# Patient Record
Sex: FEMALE | Race: WHITE | URBAN - METROPOLITAN AREA
[De-identification: names, ages, dates, MRNs, and addresses within clinical notes are randomized per-mention and may not be internally consistent; named-entity substitution may affect disease eponyms.]

---

## 2022-11-17 ENCOUNTER — OFFICE VISIT (OUTPATIENT)
Dept: INTERNAL MEDICINE CLINIC | Age: 32
End: 2022-11-17
Payer: COMMERCIAL

## 2022-11-17 VITALS
OXYGEN SATURATION: 98 % | SYSTOLIC BLOOD PRESSURE: 122 MMHG | WEIGHT: 157.6 LBS | DIASTOLIC BLOOD PRESSURE: 76 MMHG | BODY MASS INDEX: 26.91 KG/M2 | HEIGHT: 64 IN | HEART RATE: 87 BPM

## 2022-11-17 DIAGNOSIS — N89.8 VAGINAL DISCHARGE: ICD-10-CM

## 2022-11-17 DIAGNOSIS — Z11.4 ENCOUNTER FOR SCREENING FOR HIV: ICD-10-CM

## 2022-11-17 DIAGNOSIS — R53.83 OTHER FATIGUE: ICD-10-CM

## 2022-11-17 DIAGNOSIS — Z00.00 ANNUAL PHYSICAL EXAM: Primary | ICD-10-CM

## 2022-11-17 DIAGNOSIS — E66.3 OVER WEIGHT: ICD-10-CM

## 2022-11-17 DIAGNOSIS — Z11.59 NEED FOR HEPATITIS C SCREENING TEST: ICD-10-CM

## 2022-11-17 DIAGNOSIS — K59.00 CONSTIPATION, UNSPECIFIED CONSTIPATION TYPE: ICD-10-CM

## 2022-11-17 DIAGNOSIS — N92.3 SPOTTING BETWEEN MENSES: ICD-10-CM

## 2022-11-17 PROCEDURE — 99385 PREV VISIT NEW AGE 18-39: CPT | Performed by: INTERNAL MEDICINE

## 2022-11-17 PROCEDURE — 90715 TDAP VACCINE 7 YRS/> IM: CPT | Performed by: INTERNAL MEDICINE

## 2022-11-17 PROCEDURE — 90471 IMMUNIZATION ADMIN: CPT | Performed by: INTERNAL MEDICINE

## 2022-11-17 RX ORDER — POLYETHYLENE GLYCOL 3350 17 G/17G
17 POWDER, FOR SOLUTION ORAL DAILY
Qty: 1530 G | Refills: 1 | Status: SHIPPED | OUTPATIENT
Start: 2022-11-17 | End: 2022-12-17

## 2022-11-17 SDOH — ECONOMIC STABILITY: FOOD INSECURITY: WITHIN THE PAST 12 MONTHS, THE FOOD YOU BOUGHT JUST DIDN'T LAST AND YOU DIDN'T HAVE MONEY TO GET MORE.: NEVER TRUE

## 2022-11-17 SDOH — ECONOMIC STABILITY: FOOD INSECURITY: WITHIN THE PAST 12 MONTHS, YOU WORRIED THAT YOUR FOOD WOULD RUN OUT BEFORE YOU GOT MONEY TO BUY MORE.: NEVER TRUE

## 2022-11-17 ASSESSMENT — PATIENT HEALTH QUESTIONNAIRE - PHQ9
1. LITTLE INTEREST OR PLEASURE IN DOING THINGS: 0
SUM OF ALL RESPONSES TO PHQ QUESTIONS 1-9: 0
2. FEELING DOWN, DEPRESSED OR HOPELESS: 0
SUM OF ALL RESPONSES TO PHQ9 QUESTIONS 1 & 2: 0
SUM OF ALL RESPONSES TO PHQ QUESTIONS 1-9: 0

## 2022-11-17 ASSESSMENT — SOCIAL DETERMINANTS OF HEALTH (SDOH): HOW HARD IS IT FOR YOU TO PAY FOR THE VERY BASICS LIKE FOOD, HOUSING, MEDICAL CARE, AND HEATING?: NOT HARD AT ALL

## 2022-11-17 NOTE — PROGRESS NOTES
[facility]  Internal Medicine  New Patient Note  Suad Almanzar, DO      2022    Asael Olivier (:  1990) is a 27 y.o. female, here for evaluation of the following medical concerns:      SUBJECTIVE:    HPI    Annual physical     Patient is a 78-year-old female with no significant past medical history who presents today for annual physical and to establish care. Patient also has concerns about vaginal discharge. Seat belt use: yes  Smoke and carbon monoxide detectors within the home: no, education given. [place] Vaccines screen use: most times, education given   Diet: she eats pretty healthy. A lot of veggies and lean meats. Stays away from sugary items. Fried foods 1 x per week. Sugary drinks: alcohol daily  Exercise: in consistent, education given. Fire arm:yes, locked away. Ammunition . Dental exam: yes  Eye exam: yes    PAP: last in , no abnormal paps in the past. Obtain records   Hiv and hep c : complete today    Covid 19 vaccination: education given , declined  Influenza vaccine: declined  Tdap: completed today    Patient has noted vaginal spotting and odor for the last two months. She also notes some discharge. She also notes a sensation of some mild itching in the area. Patient is sexually active. She is not concerned about STDs at this time. She does feel that something is abnormal.  Last menstrual cycle was in October. Patient notes that she has dealt with spotting in between menstrual cycles before. She does not feel that she could be pregnant. She declines a pregnancy test in the office today and will take 1 at home. Patient also notes some intermittent constipation. She also notes difficulty with weight loss/weight gain and fatigue. Review of Systems ROS negative except for those noted in the HPI above.        Outpatient Medications Marked as Taking for the 22 encounter (Office Visit) with Brooke Kern, DO   Medication Sig Dispense Refill polyethylene glycol (GLYCOLAX) 17 GM/SCOOP powder Take 17 g by mouth daily 1530 g 1        No Known Allergies    No past medical history on file. No past surgical history on file. Social History     Socioeconomic History    Marital status: Unknown     Spouse name: Not on file    Number of children: Not on file    Years of education: Not on file    Highest education level: Not on file   Occupational History    Not on file   Tobacco Use    Smoking status: Never    Smokeless tobacco: Never   Vaping Use    Vaping Use: Never used   Substance and Sexual Activity    Alcohol use:  Yes     Alcohol/week: 20.0 standard drinks     Types: 10 Cans of beer, 10 Shots of liquor per week     Comment: 2-3 drinks per day    Drug use: Never    Sexual activity: Yes     Partners: Male   Other Topics Concern    Not on file   Social History Narrative    Not on file     Social Determinants of Health     Financial Resource Strain: Low Risk     Difficulty of Paying Living Expenses: Not hard at all   Food Insecurity: No Food Insecurity    Worried About Running Out of Food in the Last Year: Never true    Ran Out of Food in the Last Year: Never true   Transportation Needs: Not on file   Physical Activity: Not on file   Stress: Not on file   Social Connections: Not on file   Intimate Partner Violence: Not on file   Housing Stability: Not on file        Family History   Problem Relation Age of Onset    Diabetes Mother         pre-diabetes    High Blood Pressure Mother     Alcohol Abuse Father     Diabetes Sister         pre-diabete    High Blood Pressure Sister     Colon Cancer Maternal Uncle 48    Cancer Maternal Grandmother 48        bladder cancer    Coronary Art Dis Maternal Grandmother     Stroke Maternal Grandmother     Cerebral Aneurysm Maternal Grandmother     Diabetes Paternal Grandmother     Coronary Art Dis Paternal Grandfather          OBJECTIVE:    Vitals:    11/17/22 1432   BP: 122/76   Pulse: 87   SpO2: 98%   Weight: 157 lb 9.6 oz (71.5 kg)   Height: 5' 4\" (1.626 m)     Body mass index is 27.05 kg/m². Physical Exam  Constitutional:       General: She is not in acute distress. Appearance: Normal appearance. She is not ill-appearing. HENT:      Head: Normocephalic and atraumatic. Right Ear: External ear normal.      Left Ear: External ear normal.   Eyes:      General: No scleral icterus. Extraocular Movements: Extraocular movements intact. Conjunctiva/sclera: Conjunctivae normal.   Cardiovascular:      Rate and Rhythm: Normal rate and regular rhythm. Pulses: Normal pulses. Heart sounds: No murmur heard. No friction rub. No gallop. Pulmonary:      Effort: Pulmonary effort is normal. No respiratory distress. Breath sounds: Normal breath sounds. No wheezing, rhonchi or rales. Abdominal:      General: Bowel sounds are normal. There is no distension. Palpations: Abdomen is soft. There is no mass. Tenderness: There is no abdominal tenderness. There is no guarding or rebound. Musculoskeletal:      Cervical back: No muscular tenderness. Right lower leg: No edema. Left lower leg: No edema. Lymphadenopathy:      Cervical: No cervical adenopathy. Skin:     General: Skin is warm. Findings: No erythema or rash. Neurological:      General: No focal deficit present. Mental Status: She is alert and oriented to person, place, and time. Psychiatric:         Mood and Affect: Mood normal.         Behavior: Behavior normal.       ASSESSMENT/PLAN:  1. Annual physical exam  Seat belt use: yes  Smoke and carbon monoxide detectors within the home: no, education given. [place] screen use: most times, education given   Diet: she eats pretty healthy. A lot of veggies and lean meats. Stays away from sugary items. Fried foods 1 x per week. Sugary drinks: alcohol daily  Exercise: in consistent, education given. Fire arm:yes, locked away. Ammunition .    Dental exam: yes  Eye exam: yes    PAP: last in 2020, no abnormal paps in the past. Obtain records   Hiv and hep c : complete today    Covid 19 vaccination: education given , declined  Influenza vaccine: declined  Tdap: completed today    2. Over weight  BMI 27. Screen patient for diabetes, high cholesterol and [place] with CMP and lipid panel.  - Comprehensive Metabolic Panel; Future  - Lipid Panel; Future    3. Need for hepatitis C screening test  - Hepatitis C Antibody; Future    4. Encounter for screening for HIV  - HIV Screen; Future    5. Other fatigue  Patient also notes some fatigue. She thought this was secondary to her new job. However, she also endorses constipation, difficulty with weight loss, and spotting in between menstrual cycles. We will check TSH and CBC.  - TSH with Reflex; Future  - CBC with Auto Differential; Future    6. Constipation, unspecified constipation type  Patient noticing intermittent constipation. Sometimes having pebbly stools. We will try MiraLAX to help with intermittent constipation. - polyethylene glycol (GLYCOLAX) 17 GM/SCOOP powder; Take 17 g by mouth daily  Dispense: 1530 g; Refill: 1    7. Vaginal discharge  Patient with vaginal itching and discharge. This is in addition to spotting between menstrual cycles. Patient declines pregnancy test in the office today and will take 1 at home. Patient also declines STD testing today beyond the vaginal pathogens probe. We will check vaginal pathogens probe to rule out yeast infection, BV and trichomonas. - VAGINAL PATHOGENS PROBE *A    8. Spotting between menses  Patient declined pregnancy test in the office. We will check TSH. If this continues and there is no obvious colitis will need referral to gynecology. Patient has seen a gynecologist within the past 2 years. Return in about 4 weeks (around 12/15/2022).      The DO Mona

## 2022-11-18 LAB
CANDIDA SPECIES, DNA PROBE: ABNORMAL
GARDNERELLA VAGINALIS, DNA PROBE: ABNORMAL
TRICHOMONAS VAGINALIS DNA: ABNORMAL

## 2022-11-21 RX ORDER — METRONIDAZOLE 500 MG/1
500 TABLET ORAL 2 TIMES DAILY
Qty: 14 TABLET | Refills: 0 | Status: SHIPPED | OUTPATIENT
Start: 2022-11-21 | End: 2022-11-28

## 2022-11-22 ENCOUNTER — TELEPHONE (OUTPATIENT)
Dept: INTERNAL MEDICINE CLINIC | Age: 32
End: 2022-11-22

## 2022-11-22 NOTE — TELEPHONE ENCOUNTER
Patient is calling for her lab results. Gave her the following message from Dr. Jing Casas:    \"Please call patient and let her know hat I reviewed her labs. Overall everything looks wonderful. Her HDL the good cholesterol is high, this is protective against heart attack and stroke which is good. The rest of her cholesterol numbers are normal. Everything else was also normal. \"    Patient is requesting a call back from Dr. Jing Casas or [place] to confirm her Thyroid values are good. Patient was the most concerned about this lab and would like verification. OK to leave her a  with this information if needed.

## 2022-11-28 ENCOUNTER — TELEPHONE (OUTPATIENT)
Dept: INTERNAL MEDICINE CLINIC | Age: 32
End: 2022-11-28

## 2022-12-22 ENCOUNTER — OFFICE VISIT (OUTPATIENT)
Dept: INTERNAL MEDICINE CLINIC | Age: 32
End: 2022-12-22

## 2022-12-22 VITALS
SYSTOLIC BLOOD PRESSURE: 126 MMHG | DIASTOLIC BLOOD PRESSURE: 80 MMHG | RESPIRATION RATE: 14 BRPM | HEIGHT: 64 IN | WEIGHT: 159.4 LBS | HEART RATE: 78 BPM | OXYGEN SATURATION: 99 % | BODY MASS INDEX: 27.21 KG/M2

## 2022-12-22 DIAGNOSIS — R14.0 BLOATING: ICD-10-CM

## 2022-12-22 DIAGNOSIS — R53.83 OTHER FATIGUE: ICD-10-CM

## 2022-12-22 DIAGNOSIS — R31.9 HEMATURIA, UNSPECIFIED TYPE: Primary | ICD-10-CM

## 2022-12-22 DIAGNOSIS — N93.9 VAGINAL SPOTTING: ICD-10-CM

## 2022-12-22 NOTE — PROGRESS NOTES
Celestine Herrera (:  1990) is a 27 y.o. female,Established patient, here for evaluation of the following chief complaint(s):  Follow-up    ASSESSMENT/PLAN:  1. Hematuria, unspecified type  Patient with vaginal spotting vs hematuria. Patient unclear. This is with abdominal bloating and fatigue. Patient does not want to do a pregnancy test in office. She will do this at home. Patient urged to make an appointment with gyn.  - obtain urinalysis  - obtain pelvic ultrasound to evaluate uterus and ovaries  - follow up in 3 months  -     US PELVIS COMPLETE; Future  -     Urinalysis with Reflex to Culture  2. Bloating  See# 1  -     US PELVIS COMPLETE; Future  3. Other fatigue  See#1  4. Vaginal spotting  See #1    Return in about 3 months (around 3/22/2023) for Fatigue . SUBJECTIVE/OBJECTIVE:  HPI    Patient is a 28 yo fm with no significant pmhx who presents for follow up for fatigue and vaginal spotting. Patient has not made an appointment with gyn yet. She did have a menstrual cycle since last appointment. However she also continues to have some spotting. She has not taken a pregnancy test. She does not want to take one here. Patient notes she still has fatigue. She also notes some abdominal bloating. Review of Systems ROS negative except for those noted in the HPI above. Vitals:    22 1050   BP: 126/80   Pulse: 78   Resp: 14   SpO2: 99%     Physical Exam  Constitutional:       General: She is not in acute distress. Appearance: Normal appearance. She is not ill-appearing. HENT:      Head: Normocephalic and atraumatic. Right Ear: External ear normal.      Left Ear: External ear normal.   Eyes:      Extraocular Movements: Extraocular movements intact. Conjunctiva/sclera: Conjunctivae normal.   Cardiovascular:      Rate and Rhythm: Normal rate and regular rhythm. Heart sounds: No murmur heard. No friction rub. No gallop.    Pulmonary:      Effort: Pulmonary effort is normal. No respiratory distress. Breath sounds: Normal breath sounds. No wheezing, rhonchi or rales. Abdominal:      General: Bowel sounds are normal. There is no distension. Palpations: Abdomen is soft. Tenderness: There is no abdominal tenderness. There is no guarding or rebound. Musculoskeletal:      Right lower leg: No edema. Left lower leg: No edema. Skin:     General: Skin is warm. Findings: No erythema or rash. Neurological:      General: No focal deficit present. Mental Status: She is alert and oriented to person, place, and time. Psychiatric:         Mood and Affect: Mood normal.         Behavior: Behavior normal.       An electronic signature was used to authenticate this note.     --Angela Law,

## 2022-12-27 ENCOUNTER — TELEPHONE (OUTPATIENT)
Dept: INTERNAL MEDICINE CLINIC | Age: 32
End: 2022-12-27

## 2022-12-27 DIAGNOSIS — R31.9 HEMATURIA, UNSPECIFIED TYPE: Primary | ICD-10-CM

## 2022-12-27 NOTE — TELEPHONE ENCOUNTER
Please call patient to let her know and ask her to come back to leave a new sample. I do think its important that we send it.

## 2022-12-27 NOTE — TELEPHONE ENCOUNTER
Emi Lewis at [address] [place] [address] is calling to let Dr Walter Owens know the patient lab specimen for UA was rejected due to being \"too old. \" The sample was from 12/22. Please contact Emi Lewis at number provided with any additional questions.

## 2022-12-28 NOTE — TELEPHONE ENCOUNTER
Best option would be for patient to come in before work if possible. I am able to write her a note for being a little late to work if her employer would accept that. We can do this in the back, lab does not need to do it since it is a UA.  She could come as early as 750/8am.

## 2022-12-28 NOTE — TELEPHONE ENCOUNTER
Left a message for patient to let her know the order has been placed and she can have this done on a Saturday

## 2023-01-31 ENCOUNTER — HOSPITAL ENCOUNTER (OUTPATIENT)
Dept: ULTRASOUND IMAGING | Age: 33
Discharge: HOME | End: 2023-01-31
Payer: COMMERCIAL

## 2023-01-31 DIAGNOSIS — R31.9 HEMATURIA, UNSPECIFIED TYPE: ICD-10-CM

## 2023-01-31 DIAGNOSIS — R14.0 BLOATING: ICD-10-CM

## 2023-01-31 PROCEDURE — 76830 TRANSVAGINAL US NON-OB: CPT

## 2023-01-31 PROCEDURE — 76856 US EXAM PELVIC COMPLETE: CPT

## 2023-02-08 ENCOUNTER — TELEPHONE (OUTPATIENT)
Dept: INTERNAL MEDICINE CLINIC | Age: 33
End: 2023-02-08

## 2023-02-08 DIAGNOSIS — D25.9 UTERINE LEIOMYOMA, UNSPECIFIED LOCATION: Primary | ICD-10-CM

## 2023-02-08 DIAGNOSIS — E28.2 POLYCYSTIC OVARIES: ICD-10-CM

## 2023-02-08 NOTE — TELEPHONE ENCOUNTER
Patient is calling for her results from [facility]. Gave her the following message from Dr. Alvin Rm:  \"Please call patient and tell her I have reviewed her ultrasound. It found that she has some small fibroids. This could cause some abnormal bleeding. She also has polycystic ovaries. Secondary to these findings I think its important that she sees a gynecologist.  Please make sure patient has a gynecologist if not I will refer her to Dr. Carlos Delgado"    Patient states she does not currently see a gynecologist and would like to be referred to Dr. Vineet Tuttle. Patient had no additional questions.